# Patient Record
Sex: MALE | Race: BLACK OR AFRICAN AMERICAN | NOT HISPANIC OR LATINO | Employment: FULL TIME | URBAN - METROPOLITAN AREA
[De-identification: names, ages, dates, MRNs, and addresses within clinical notes are randomized per-mention and may not be internally consistent; named-entity substitution may affect disease eponyms.]

---

## 2022-04-25 ENCOUNTER — CLINICAL SUPPORT (OUTPATIENT)
Dept: SMOKING CESSATION | Facility: CLINIC | Age: 42
End: 2022-04-25
Payer: COMMERCIAL

## 2022-04-25 DIAGNOSIS — F17.200 NICOTINE DEPENDENCE: Primary | ICD-10-CM

## 2022-04-25 PROCEDURE — 99404 PR PREVENT COUNSEL,INDIV,60 MIN: ICD-10-PCS | Mod: S$GLB,,,

## 2022-04-25 PROCEDURE — 99404 PREV MED CNSL INDIV APPRX 60: CPT | Mod: S$GLB,,,

## 2022-04-25 RX ORDER — IBUPROFEN 200 MG
1 TABLET ORAL DAILY
Qty: 14 PATCH | Refills: 0 | Status: SHIPPED | OUTPATIENT
Start: 2022-04-25 | End: 2022-05-23 | Stop reason: SDUPTHER

## 2022-04-25 NOTE — Clinical Note
Patient will be participating in biweekly tobacco cessation sessions and will begin the prescribed tobacco cessation medication regimen of 21 mg nicotine patch daily. Patient is currently using the nicotine patch.

## 2022-05-10 ENCOUNTER — TELEPHONE (OUTPATIENT)
Dept: SMOKING CESSATION | Facility: CLINIC | Age: 42
End: 2022-05-10
Payer: COMMERCIAL

## 2022-05-23 ENCOUNTER — CLINICAL SUPPORT (OUTPATIENT)
Dept: SMOKING CESSATION | Facility: CLINIC | Age: 42
End: 2022-05-23

## 2022-05-23 DIAGNOSIS — F17.200 NICOTINE DEPENDENCE: Primary | ICD-10-CM

## 2022-05-23 PROCEDURE — 99402 PR PREVENT COUNSEL,INDIV,30 MIN: ICD-10-PCS | Mod: S$GLB,,,

## 2022-05-23 PROCEDURE — 99402 PREV MED CNSL INDIV APPRX 30: CPT | Mod: S$GLB,,,

## 2022-05-23 RX ORDER — IBUPROFEN 200 MG
1 TABLET ORAL DAILY
Qty: 14 PATCH | Refills: 0 | Status: SHIPPED | OUTPATIENT
Start: 2022-05-23 | End: 2022-09-13 | Stop reason: SDUPTHER

## 2022-05-23 RX ORDER — VARENICLINE TARTRATE 1 MG/1
1 TABLET, FILM COATED ORAL 2 TIMES DAILY
Qty: 52 TABLET | Refills: 0 | Status: SHIPPED | OUTPATIENT
Start: 2022-05-23 | End: 2022-06-21 | Stop reason: SDUPTHER

## 2022-05-23 NOTE — PROGRESS NOTES
Individual Follow-Up Form    5/23/2022    Quit Date:     Clinical Status of Patient: Outpatient      Continuing Medication: Yes, patch    Other Medications:      Target Symptoms: Withdrawal and medication side effects. The following were  rated moderate (3) to severe (4) on TCRS:  · Moderate (3):   · Severe (4):     Comments: Patient was seen in clinic today. Patient stated that he is currently smokes 3 cigarillos per day.  Patient remains on tobacco cessation medication regimen of 21 mg nicotine patch and was refill today.  Patient said he sweats a lot at work and unable to keep the patch on.  Patient to try Chantix and prescription was sent to pharmacy. Discussed and reviewed strategies, cues, and triggers. Introduced the negative impact of tobacco on health, the health advantages of discontinuing the use of tobacco, time line improved health changes after a quit, withdrawal issues to expect from nicotine and habit, and ways to achieve the goal of a quit.      Diagnosis: F17.200    Next Visit: 2 weeks

## 2022-05-23 NOTE — Clinical Note
Patient was seen in clinic today. Patient stated that he is currently smokes 3 cigarillos per day.  Patient remains on tobacco cessation medication regimen of 21 mg nicotine patch and was refill today.  Patient said he sweats a lot at work and unable to keep the patch on.  Patient to try Chantix and prescription was sent to pharmacy. Discussed and reviewed strategies, cues, and triggers. Introduced the negative impact of tobacco on health, the health advantages of discontinuing the use of tobacco, time line improved health changes after a quit, withdrawal issues to expect from nicotine and habit, and ways to achieve the goal of a quit.

## 2022-06-06 ENCOUNTER — CLINICAL SUPPORT (OUTPATIENT)
Dept: SMOKING CESSATION | Facility: CLINIC | Age: 42
End: 2022-06-06

## 2022-06-06 DIAGNOSIS — F17.200 NICOTINE DEPENDENCE: ICD-10-CM

## 2022-06-06 PROCEDURE — 99403 PR PREVENT COUNSEL,INDIV,45 MIN: ICD-10-PCS | Mod: S$GLB,,, | Performed by: FAMILY MEDICINE

## 2022-06-06 PROCEDURE — 99403 PREV MED CNSL INDIV APPRX 45: CPT | Mod: S$GLB,,, | Performed by: FAMILY MEDICINE

## 2022-06-06 NOTE — Clinical Note
Patient was seen in clinic today.  Patient stated that he smokes 1-2 cigarillos per day.  Commended patient on his accomplishment.  Patient CO monitor was 26 ppm.  Patient's truck broke down on his way to the clinic.  Therefore, he smoked right before he was seen.  Patient also reported that he is breathing better. Introduced the negative impact of tobacco on health, the health advantages of discontinuing the use of tobacco, timeline improved health changes after a quit, withdrawal issues to expect from nicotine and habit, and ways to achieve the goal of a quit.  Patient remains on prescribed tobacco cessation medication regimen of 21 mg patch and 1 mg Chnatix BID without any negative side effects at this time.  The patient will continue with sessions bi weekly and medications monitoring by CTTS.  Prescribed medication management will be by physician.

## 2022-06-06 NOTE — PROGRESS NOTES
Individual Follow-Up Form    6/6/2022    Quit Date:     Clinical Status of Patient: Outpatient        Continuing Medication: yes  Chantix, patch    Other Medications:      Target Symptoms: Withdrawal and medication side effects. The following were  rated moderate (3) to severe (4) on TCRS:  · Moderate (3): none  · Severe (4): none    Comments: Patient was seen in clinic today.  Patient stated that he smokes 1-2 cigarillos per day.  Commended patient on his accomplishment.  Patient CO monitor was 26 ppm.  Patient's truck broke down on his way to the clinic.  Therefore, he smoked right before he was seen.  Patient also reported that he is breathing better.  Introduced the negative impact of tobacco on health, the health advantages of discontinuing the use of tobacco, timeline improved health changes after a quit, withdrawal issues to expect from nicotine and habit, and ways to achieve the goal of a quit.  Patient remains on prescribed tobacco cessation medication regimen of 21 mg patch and 1 mg Chnatix BID without any negative side effects at this time.  The patient will continue with sessions bi weekly and medications monitoring by CTTS.  Prescribed medication management will be by physician.      Diagnosis: F17.200    Next Visit: 2 weeks

## 2022-06-20 ENCOUNTER — CLINICAL SUPPORT (OUTPATIENT)
Dept: SMOKING CESSATION | Facility: CLINIC | Age: 42
End: 2022-06-20
Payer: COMMERCIAL

## 2022-06-20 DIAGNOSIS — F17.200 NICOTINE DEPENDENCE: Primary | ICD-10-CM

## 2022-06-20 PROCEDURE — 99402 PR PREVENT COUNSEL,INDIV,30 MIN: ICD-10-PCS | Mod: S$GLB,,,

## 2022-06-20 PROCEDURE — 99402 PREV MED CNSL INDIV APPRX 30: CPT | Mod: S$GLB,,,

## 2022-06-20 NOTE — Clinical Note
Spoke with patient today via telephone for a length of time, he states smoking 1-2 cigarillos per day. The patient remains on the prescribed tobacco cessation medication regimen of 1 mg Chantix BID without any negative side effects at this time. The patient denies any abnormal behavioral or mental changes at this time. Refill sent to pharmacy. Patient states the cinnamon toothpicks are really helpful and will return to clinic to pick them up. Discussed and reviewed further strategies with patient, he verbalized understanding. He states being very pleased with his progress thus far and look forward to a quit soon, encouraged patient to set a quit date. The patient will continue with therapy sessions and medication monitoring by CTTS. Prescribed medication management will be by physician.

## 2022-06-21 ENCOUNTER — TELEPHONE (OUTPATIENT)
Dept: SMOKING CESSATION | Facility: CLINIC | Age: 42
End: 2022-06-21
Payer: COMMERCIAL

## 2022-06-21 RX ORDER — VARENICLINE TARTRATE 1 MG/1
1 TABLET, FILM COATED ORAL 2 TIMES DAILY
Qty: 56 TABLET | Refills: 0 | Status: SHIPPED | OUTPATIENT
Start: 2022-06-21 | End: 2022-07-19 | Stop reason: SDUPTHER

## 2022-06-21 NOTE — PROGRESS NOTES
Individual Follow-Up Form    6/21/2022    Quit Date:     Clinical Status of Patient: Outpatient        Continuing Medication: yes  Chantix    Other Medications:      Target Symptoms: Withdrawal and medication side effects. The following were  rated moderate (3) to severe (4) on TCRS:  · Moderate (3):   · Severe (4):     Comments: Spoke with patient today via telephone for a length of time, he states smoking 1-2 cigarillos per day. The patient remains on the prescribed tobacco cessation medication regimen of 1 mg Chantix BID without any negative side effects at this time. The patient denies any abnormal behavioral or mental changes at this time. Refill sent to pharmacy. Patient states the cinnamon toothpicks are really helpful and will return to clinic to pick them up. Discussed and reviewed further strategies with patient, he verbalized understanding. He states being very pleased with his progress thus far and look forward to a quit soon, encouraged patient to set a quit date. The patient will continue with therapy sessions and medication monitoring by CTTS. Prescribed medication management will be by physician.          Diagnosis: F17.200    Next Visit: 2 weeks     Vaginal Delivery

## 2022-06-21 NOTE — TELEPHONE ENCOUNTER
Called patient in regard to missed follow up appointment, had to leave a detailed message with name and telephone number for a call back.

## 2022-07-05 ENCOUNTER — CLINICAL SUPPORT (OUTPATIENT)
Dept: SMOKING CESSATION | Facility: CLINIC | Age: 42
End: 2022-07-05
Payer: COMMERCIAL

## 2022-07-05 DIAGNOSIS — F17.200 NICOTINE DEPENDENCE: ICD-10-CM

## 2022-07-05 PROCEDURE — 99404 PREV MED CNSL INDIV APPRX 60: CPT | Mod: S$GLB,,,

## 2022-07-05 PROCEDURE — 99404 PR PREVENT COUNSEL,INDIV,60 MIN: ICD-10-PCS | Mod: S$GLB,,,

## 2022-07-05 NOTE — Clinical Note
Patient was seen in clinic today for follow up.  Patient reports that he smokes 1-2 cigarillos per day.  Patient also relighting his cigarillos. The patient remains on the prescribed tobacco cessation medication  regimen of 1 mg Chantix BID without any negative side effects at this time.  No refill is needed. The patient denies any abnormal behavior or mental changes at this time.  Reviewed distraction strategies and relaxation techniques with patient. The patient will continue with sessions and medication monitoring by CTTS.  Prescribed medication management will be by physician.

## 2022-07-05 NOTE — PROGRESS NOTES
Individual Follow-Up Form    7/5/2022    Quit Date:     Clinical Status of Patient: Outpatient      Continuing Medication: yes  Chantix    Other Medications: none     Target Symptoms: Withdrawal and medication side effects. The following were  rated moderate (3) to severe (4) on TCRS:  · Moderate (3): none  · Severe (4): none    Comments:  Patient was seen in clinic today for follow up.  Patient reports that he smokes 1-2 cigarillos per day.  Patient also relighting his cigarillos. The patient remains on the prescribed tobacco cessation medication  regimen of 1 mg Chantix BID without any negative side effects at this time.  No refill is needed. The patient denies any abnormal behavior or mental changes at this time.  Reviewed distraction strategies and relaxation techniques with patient. The patient will continue with sessions and medication monitoring by CTTS.  Prescribed medication management will be by physician.        Diagnosis: F17.200    Next Visit: 2 weeks

## 2022-07-19 ENCOUNTER — CLINICAL SUPPORT (OUTPATIENT)
Dept: SMOKING CESSATION | Facility: CLINIC | Age: 42
End: 2022-07-19

## 2022-07-19 DIAGNOSIS — F17.200 NICOTINE DEPENDENCE: ICD-10-CM

## 2022-07-19 PROCEDURE — 99403 PREV MED CNSL INDIV APPRX 45: CPT | Mod: S$GLB,,, | Performed by: FAMILY MEDICINE

## 2022-07-19 PROCEDURE — 99403 PR PREVENT COUNSEL,INDIV,45 MIN: ICD-10-PCS | Mod: S$GLB,,, | Performed by: FAMILY MEDICINE

## 2022-07-19 RX ORDER — VARENICLINE TARTRATE 1 MG/1
1 TABLET, FILM COATED ORAL 2 TIMES DAILY
Qty: 56 TABLET | Refills: 0 | Status: SHIPPED | OUTPATIENT
Start: 2022-07-19 | End: 2022-08-16 | Stop reason: SDUPTHER

## 2022-07-19 NOTE — Clinical Note
Patient was seen in clinic today for follow up.  Patient states that he smokes 1 cigarillo a day.  Commended patient on the accomplishment.  The patient remains on the prescribed tobacco cessation medication  regimen of 1 mg Chantix BID  without any negative side effects at this time. Refill sent to pharmacy. The patient denies any abnormal behavior or mental changes at this time. Reviewed strategies, habitual behavior, stress, and high-risk situations. Introduced stress with addition interventions, SOLVE, relaxation with interventions, nutrition, exercise, weight gain, and the importance of rewarding oneself for accomplishments toward becoming  tobacco free. Open discussion of all items with interventions.  Prescribed medication management will be by physician.  Patient will continue bi weekly sessions.

## 2022-07-19 NOTE — PROGRESS NOTES
Individual Follow-Up Form    7/19/2022    Quit Date:     Clinical Status of Patient: Outpatient      Continuing Medication: yes  Chantix       Target Symptoms: Withdrawal and medication side effects. The following were  rated moderate (3) to severe (4) on TCRS:  · Moderate (3): none  · Severe (4): none    Comments:  Patient was seen in clinic today for follow up.  Patient states that he smokes 1 cigarillo a day.  Commended patient on the accomplishment.  The patient remains on the prescribed tobacco cessation medication  regimen of 1 mg Chantix BID  without any negative side effects at this time. Refill sent to pharmacy. The patient denies any abnormal behavior or mental changes at this time. Reviewed strategies, habitual behavior, stress, and high-risk situations. Introduced stress with addition interventions, SOLVE, relaxation with interventions, nutrition, exercise, weight gain, and the importance of rewarding oneself for accomplishments toward becoming  tobacco free. Open discussion of all items with interventions.  Prescribed medication management will be by physician.  Patient will continue bi weekly sessions.         Diagnosis: F17.200    Next Visit: 2 weeks

## 2022-08-02 ENCOUNTER — TELEPHONE (OUTPATIENT)
Dept: SMOKING CESSATION | Facility: CLINIC | Age: 42
End: 2022-08-02
Payer: COMMERCIAL

## 2022-08-02 NOTE — TELEPHONE ENCOUNTER
Called patient regarding missed follow up appointment.  Leave message and contact number was given.

## 2022-08-03 ENCOUNTER — CLINICAL SUPPORT (OUTPATIENT)
Dept: SMOKING CESSATION | Facility: CLINIC | Age: 42
End: 2022-08-03

## 2022-08-03 DIAGNOSIS — F17.200 NICOTINE DEPENDENCE: ICD-10-CM

## 2022-08-03 PROCEDURE — 99402 PREV MED CNSL INDIV APPRX 30: CPT | Mod: S$GLB,,, | Performed by: FAMILY MEDICINE

## 2022-08-03 PROCEDURE — 99402 PR PREVENT COUNSEL,INDIV,30 MIN: ICD-10-PCS | Mod: S$GLB,,, | Performed by: FAMILY MEDICINE

## 2022-08-03 NOTE — PROGRESS NOTES
Individual Follow-Up Form    8/3/2022    Quit Date:     Clinical Status of Patient: Outpatient      Continuing Medication: yes  Chantix       Target Symptoms: Withdrawal and medication side effects. The following were  rated moderate (3) to severe (4) on TCRS:  · Moderate (3): none  · Severe (4): none      Comments:  Telephone visit, spoke to patient on the phone today.  Patient continues  to smoke  1 cigarillo  per day.  Patient remains on the prescribed  tobacco cessation medication regimen of 1 mg Chantix BID without any side effects at this time.  No refill is needed. Patient reports having  family issues since last visit which interfered with focus on quitting or rate fading. We reviewed distraction strategies, urges and relaxation technique.  Prescribed medication management will be by physician.  Patient will continue bi weekly sessions.      Diagnosis: F17.200    Next Visit: 2 weeks

## 2022-08-03 NOTE — Clinical Note
Telephone visit, spoke to patient on the phone today.  Patient continues  to smoke  1 cigarillo  per day.  Patient remains on the prescribed  tobacco cessation medication regimen of 1 mg Chantix BID without any side effects at this time.  No refill is needed. Patient reports having  family issues since last visit which interfered with focus on quitting or rate fading. We reviewed distraction strategies, urges and relaxation technique.  Prescribed medication management will be by physician.  Patient will continue bi weekly sessions.

## 2022-08-05 ENCOUNTER — CLINICAL SUPPORT (OUTPATIENT)
Dept: SMOKING CESSATION | Facility: CLINIC | Age: 42
End: 2022-08-05

## 2022-08-05 DIAGNOSIS — F17.200 NICOTINE DEPENDENCE: Primary | ICD-10-CM

## 2022-08-05 PROCEDURE — 99407 BEHAV CHNG SMOKING > 10 MIN: CPT | Mod: S$GLB,,,

## 2022-08-05 PROCEDURE — 99999 PR PBB SHADOW E&M-EST. PATIENT-LVL I: ICD-10-PCS | Mod: PBBFAC,,,

## 2022-08-05 PROCEDURE — 99407 PR TOBACCO USE CESSATION INTENSIVE >10 MINUTES: ICD-10-PCS | Mod: S$GLB,,,

## 2022-08-05 PROCEDURE — 99999 PR PBB SHADOW E&M-EST. PATIENT-LVL I: CPT | Mod: PBBFAC,,,

## 2022-08-07 NOTE — PROGRESS NOTES
Called pt to f/u on his 3 month smoking cessation quit status. Pt stated he is still smoking, but down to 1 cigarillo and he is still actively enrolled in program. Informed him of benefit period, phone follow ups, and contact information. Will complete smart form for 3 months and will continue to follow up on quit #1 episode.

## 2022-08-16 ENCOUNTER — CLINICAL SUPPORT (OUTPATIENT)
Dept: SMOKING CESSATION | Facility: CLINIC | Age: 42
End: 2022-08-16

## 2022-08-16 DIAGNOSIS — F17.200 NICOTINE DEPENDENCE: Primary | ICD-10-CM

## 2022-08-16 PROCEDURE — 99404 PR PREVENT COUNSEL,INDIV,60 MIN: ICD-10-PCS | Mod: S$GLB,,,

## 2022-08-16 PROCEDURE — 99404 PREV MED CNSL INDIV APPRX 60: CPT | Mod: S$GLB,,,

## 2022-08-16 RX ORDER — VARENICLINE TARTRATE 1 MG/1
1 TABLET, FILM COATED ORAL 2 TIMES DAILY
Qty: 56 TABLET | Refills: 0 | Status: SHIPPED | OUTPATIENT
Start: 2022-08-16 | End: 2022-09-13 | Stop reason: ALTCHOICE

## 2022-08-16 NOTE — Clinical Note
Telephonic  visit, spoke to patient today for follow up. Patient continues to smoke 1 cigarillo a day with relighting.  Patient remains on the prescribed  tobacco cessation medication regimen of 1 mg Chantix BID without any side effects at this time.  Refill sent to pharmacy.   Patient reports he is having lots of stress in his life which interfered with focus on quitting or rate fading.  Reviewed strategies to manage unexpected difficulties.  Patient's benefits end today.  Informed patient of benefit period.  Patient would like to continue with the program.  Patient is scheduled on 08-.  Will renew benefits on 08-.  Prescribed medication management will be by physician.

## 2022-08-16 NOTE — PROGRESS NOTES
Individual Follow-Up Form    8/16/2022    Quit Date:     Clinical Status of Patient: Outpatient      Continuing Medication: yes  Chantix       Target Symptoms: Withdrawal and medication side effects. The following were  rated moderate (3) to severe (4) on TCRS:  · Moderate (3): none  · Severe (4): none    Comments:  Telephonic  visit, spoke to patient today for follow up. Patient continues to smoke 1 cigarillo a day with relighting.  Patient remains on the prescribed  tobacco cessation medication regimen of 1 mg Chantix BID without any side effects at this time.  Refill sent to pharmacy.   Patient reports he is having lots of stress in his life which interfered with focus on quitting or rate fading.  Reviewed strategies to manage unexpected difficulties.  Patient's benefits end today.  Informed patient of benefit period.  Patient would like to continue with the program.  Patient is scheduled on 08-.  Will renew benefits on 08-.  Prescribed medication management will be by physician.    Diagnosis: F17.200    Next Visit: 2 weeks

## 2022-08-30 ENCOUNTER — CLINICAL SUPPORT (OUTPATIENT)
Dept: SMOKING CESSATION | Facility: CLINIC | Age: 42
End: 2022-08-30

## 2022-08-30 DIAGNOSIS — F17.200 NICOTINE DEPENDENCE: Primary | ICD-10-CM

## 2022-08-30 PROCEDURE — 99404 PR PREVENT COUNSEL,INDIV,60 MIN: ICD-10-PCS | Mod: S$GLB,,,

## 2022-08-30 PROCEDURE — 99404 PREV MED CNSL INDIV APPRX 60: CPT | Mod: S$GLB,,,

## 2022-08-30 NOTE — PROGRESS NOTES
Individual Follow-Up Form    8/30/2022    Quit Date:     Clinical Status of Patient: Outpatient      Continuing Medication: yes  Chantix       Target Symptoms: Withdrawal and medication side effects. The following were  rated moderate (3) to severe (4) on TCRS:  Moderate (3): none  Severe (4): none    Comments: Patient was seen in clinic today for follow up. Patient continues  to smoke 1-2 cigarillos per day.  Patient remains on the prescribed  tobacco cessation medication regimen of 1 mg Chantix BID without any side effects at this time.  No refill is needed.   Encouraged patient to pick a quit date.  Patient reports having family issues last few months which interfered with focus on quitting or rate fading.  Reviewed strategies to manage unexpected difficulties.  Reviewed strategies, controlling environment, cues, triggers, new goals set. Introduced high risk situations with preparation interventions, caffeine similarities with withdrawal issue of habit and nicotine, alcohol, understanding urges, cravings, stress, and relaxation. Open discussion with intervention discussion.  Prescribed medication management will be by physician.  Patient will continue bi weekly sessions.      Diagnosis: F17.200    Next Visit: 2 weeks

## 2022-09-13 ENCOUNTER — CLINICAL SUPPORT (OUTPATIENT)
Dept: SMOKING CESSATION | Facility: CLINIC | Age: 42
End: 2022-09-13
Payer: COMMERCIAL

## 2022-09-13 DIAGNOSIS — F17.200 NICOTINE DEPENDENCE: Primary | ICD-10-CM

## 2022-09-13 PROCEDURE — 99404 PR PREVENT COUNSEL,INDIV,60 MIN: ICD-10-PCS | Mod: S$GLB,,,

## 2022-09-13 PROCEDURE — 99404 PREV MED CNSL INDIV APPRX 60: CPT | Mod: S$GLB,,,

## 2022-09-13 RX ORDER — BUPROPION HYDROCHLORIDE 150 MG/1
TABLET, EXTENDED RELEASE ORAL
Qty: 60 TABLET | Refills: 0 | Status: SHIPPED | OUTPATIENT
Start: 2022-09-13 | End: 2022-10-10 | Stop reason: SDUPTHER

## 2022-09-13 RX ORDER — IBUPROFEN 200 MG
1 TABLET ORAL DAILY
Qty: 14 PATCH | Refills: 0 | Status: SHIPPED | OUTPATIENT
Start: 2022-09-13 | End: 2024-01-24 | Stop reason: SDUPTHER

## 2022-09-13 NOTE — Clinical Note
Patient was seen in clinic today for follow up.  Patient continues  to smoke 1-2 cigarillos  per day.  Patient remains on the prescribed  tobacco cessation medication regimen of 1 mg Chantix BID without any side effects at this time. No refill.   We discussed about Wellbutrin.  The patient will begin the prescribed tobacco cessation medication regimen of 150 mg Wellbutrin SR with up titration dosage and 21 mg nicotine patch.  Patient reports having  family  issues which interfered with focus on quitting or rate fading.  We discussed the importance of stress management and finding other ways to deal with stress.  Discussed rate fading with patient.  Patient's goal for the next 2 weeks is to stay tobacco free for 24 hours.  Prescribed medication management will be by physician. Patient will continue with sessions and medication monitoring by CTTS.  Patient will continue bi weekly sessions.

## 2022-09-13 NOTE — PROGRESS NOTES
Individual Follow-Up Form    9/13/2022    Quit Date:     Clinical Status of Patient: Outpatient      Continuing Medication: yes  Chantix       Target Symptoms: Withdrawal and medication side effects. The following were  rated moderate (3) to severe (4) on TCRS:  Moderate (3): none  Severe (4): none    Comments: Patient was seen in clinic today for follow up.  Patient continues  to smoke 1-2 cigarillos  per day.  Patient remains on the prescribed  tobacco cessation medication regimen of 1 mg Chantix BID without any side effects at this time. No refill.   We discussed about Wellbutrin.  The patient will begin the prescribed tobacco cessation medication regimen of 150 mg Wellbutrin SR with up titration dosage and 21 mg nicotine patch.  Patient reports having  family  issues which interfered with focus on quitting or rate fading.  We discussed the importance of stress management and finding other ways to deal with stress.  Discussed rate fading with patient.  Patient's goal for the next 2 weeks is to stay tobacco free for 24 hours.  Prescribed medication management will be by physician. Patient will continue with sessions and medication monitoring by CTTS.  Patient will continue bi weekly sessions.       Diagnosis: F17.200    Next Visit: 2 weeks

## 2022-09-26 ENCOUNTER — CLINICAL SUPPORT (OUTPATIENT)
Dept: SMOKING CESSATION | Facility: CLINIC | Age: 42
End: 2022-09-26

## 2022-09-26 DIAGNOSIS — F17.200 NICOTINE DEPENDENCE: Primary | ICD-10-CM

## 2022-09-26 PROCEDURE — 99403 PREV MED CNSL INDIV APPRX 45: CPT | Mod: S$GLB,,,

## 2022-09-26 PROCEDURE — 99403 PR PREVENT COUNSEL,INDIV,45 MIN: ICD-10-PCS | Mod: S$GLB,,,

## 2022-09-26 NOTE — Clinical Note
Patient was seen in clinic today for follow up.  Patient states he smokes 3 cigarillos per day.   Patient remains the prescribed tobacco cessation medication regimen of 150 mg Wellbutrin SR BID and 21 mg nicotine patch without any side effects at this time. No refill is needed.   Patient reports having stresses recently which interfered with focus on quitting or rate fading.  We discussed the importance of stress management and finding other ways to deal with stress.  Discussed changing habitual behavior  and using 15 minutes delay to break routine tobacco use.  We discussed willpower and willingness to quit   The patient denies any abnormal behavior or mental changes at this time.  We discussed goal the the next two week.  Patient will try again to stay tobacco free for 24 hours. Prescribed medication management will be by physician.  Patient will continue bi weekly sessions.

## 2022-09-26 NOTE — PROGRESS NOTES
Individual Follow-Up Form    9/26/2022    Quit Date:     Clinical Status of Patient: Outpatient    Continuing Medication: yes  Wellbutrin, patch     Target Symptoms: Withdrawal and medication side effects. The following were  rated moderate (3) to severe (4) on TCRS:  Moderate (3): none  Severe (4): none    Comments: Patient was seen in clinic today for follow up.  Patient states he smokes 3 cigarillos per day.   Patient remains the prescribed tobacco cessation medication regimen of 150 mg Wellbutrin SR BID and 21 mg nicotine patch without any side effects at this time. No refill is needed.   Patient reports having stresses recently which interfered with focus on quitting or rate fading.  We discussed the importance of stress management and finding other ways to deal with stress.  Discussed changing habitual behavior  and using 15 minutes delay to break routine tobacco use.  We discussed willpower and willingness to quit   The patient denies any abnormal behavior or mental changes at this time.  We discussed goal the the next two week.  Patient will try again to stay tobacco free for 24 hours. Prescribed medication management will be by physician.  Patient will continue bi weekly sessions.    Diagnosis: F17.200    Next Visit: 2 weeks

## 2022-10-10 ENCOUNTER — CLINICAL SUPPORT (OUTPATIENT)
Dept: SMOKING CESSATION | Facility: CLINIC | Age: 42
End: 2022-10-10

## 2022-10-10 DIAGNOSIS — F17.200 NICOTINE DEPENDENCE: ICD-10-CM

## 2022-10-10 PROCEDURE — 99402 PREV MED CNSL INDIV APPRX 30: CPT | Mod: S$GLB,,,

## 2022-10-10 PROCEDURE — 99402 PR PREVENT COUNSEL,INDIV,30 MIN: ICD-10-PCS | Mod: S$GLB,,,

## 2022-10-10 RX ORDER — BUPROPION HYDROCHLORIDE 150 MG/1
TABLET, EXTENDED RELEASE ORAL
Qty: 60 TABLET | Refills: 0 | Status: SHIPPED | OUTPATIENT
Start: 2022-10-10 | End: 2022-11-09

## 2022-10-10 NOTE — Clinical Note
Telephonic  visit, spoke to patient today for follow up. Patient states smoking 1-2 cigarillos per day.  Commended patient on the accomplishment.  Patient remains on the prescribed tobacco cessation medication regimen of  150 mg Wellbutrin SR BID  and 21 mg nicotine patch without any negative side effects at this time.  Refill of 150 mg Wellbutrin SR sent to pharmacy.  The patient denies any abnormal behavior or mental changes at this time.  We reviewed the importance of quitting and health benefits to expect.  We discussed distraction strategies.  Prescribed medication management will be by physician. Patient will continue with sessions and medication monitoring by CTTS.  Patient will continue bi weekly sessions.

## 2022-10-10 NOTE — PROGRESS NOTES
Individual Follow-Up Form    10/10/2022    Quit Date:     Clinical Status of Patient: Outpatient    Continuing Medication: yes  Wellbutrin, patches     Target Symptoms: Withdrawal and medication side effects. The following were  rated moderate (3) to severe (4) on TCRS:  Moderate (3): none  Severe (4): none    Comments:  Telephonic  visit, spoke to patient today for follow up. Patient states smoking 1-2 cigarillos per day.  Commended patient on the accomplishment.  Patient remains on the prescribed tobacco cessation medication regimen of  150 mg Wellbutrin SR BID  and 21 mg nicotine patch without any negative side effects at this time.  Refill of 150 mg Wellbutrin SR sent to pharmacy.  The patient denies any abnormal behavior or mental changes at this time.  We reviewed the importance of quitting and health benefits to expect.  We discussed distraction strategies.  Prescribed medication management will be by physician. Patient will continue with sessions and medication monitoring by CTTS.  Patient will continue bi weekly sessions.      Diagnosis: F17.200    Next Visit: 2 weeks

## 2022-10-24 ENCOUNTER — CLINICAL SUPPORT (OUTPATIENT)
Dept: SMOKING CESSATION | Facility: CLINIC | Age: 42
End: 2022-10-24

## 2022-10-24 DIAGNOSIS — F17.200 NICOTINE DEPENDENCE: Primary | ICD-10-CM

## 2022-10-24 PROCEDURE — 99404 PREV MED CNSL INDIV APPRX 60: CPT | Mod: S$GLB,,,

## 2022-10-24 PROCEDURE — 99404 PR PREVENT COUNSEL,INDIV,60 MIN: ICD-10-PCS | Mod: S$GLB,,,

## 2022-10-24 NOTE — Clinical Note
Patient was seen in clinic today for follow up.  Patient remains tobacco free since October 12, 2022.  Commended patient on the accomplishment.  The patient remains on the prescribed tobacco cessation medication regimen of 21 mg nicotine patch  and 150 mg Wellbutrin SR BID  without any negative side effects at this time.  No refill is needed.   Patient reports increased appetite and weight gain.  Patient also reports that he feels better and breathes better.   Completion of TCRS (Tobacco Cessation Rating Scale) reviewed strategies, cues, triggers, high risk situations, lapses, relapses, diet, exercise, stress, relaxation, sleep, habitual behavior and lifestyle changes.  Patient will continue bi weekly sessions.

## 2022-10-24 NOTE — PROGRESS NOTES
Individual Follow-Up Form    10/24/2022    Quit Date:     Clinical Status of Patient: Outpatient    Continuing Medication: yes  Wellbutrin, patches     Target Symptoms: Withdrawal and medication side effects. The following were  rated moderate (3) to severe (4) on TCRS:  Moderate (3): none  Severe (4): none    Comments: Patient was seen in clinic today for follow up.  Patient remains tobacco free since October 12, 2022.  Commended patient on the accomplishment.  The patient remains on the prescribed tobacco cessation medication regimen of 21 mg nicotine patch  and 150 mg Wellbutrin SR BID  without any negative side effects at this time.  No refill is needed.   Patient reports increased appetite and weight gain.  Patient also reports that he feels better and breathes better.   Completion of TCRS (Tobacco Cessation Rating Scale) reviewed strategies, cues, triggers, high risk situations, lapses, relapses, diet, exercise, stress, relaxation, sleep, habitual behavior and lifestyle changes.  Patient will continue bi weekly sessions.      Diagnosis: F17.200    Next Visit: 2 weeks

## 2022-11-01 ENCOUNTER — TELEPHONE (OUTPATIENT)
Dept: SMOKING CESSATION | Facility: CLINIC | Age: 42
End: 2022-11-01
Payer: COMMERCIAL

## 2022-11-07 ENCOUNTER — CLINICAL SUPPORT (OUTPATIENT)
Dept: SMOKING CESSATION | Facility: CLINIC | Age: 42
End: 2022-11-07

## 2022-11-07 DIAGNOSIS — F17.200 NICOTINE DEPENDENCE: Primary | ICD-10-CM

## 2022-11-07 PROCEDURE — 99407 PR TOBACCO USE CESSATION INTENSIVE >10 MINUTES: ICD-10-PCS | Mod: S$GLB,,,

## 2022-11-07 PROCEDURE — 99407 BEHAV CHNG SMOKING > 10 MIN: CPT | Mod: S$GLB,,,

## 2022-11-07 NOTE — PROGRESS NOTES
Sm[facility]essation [facility]-called patient regarding missed follow up  appointment.  Patient reports a lapse smoked 1 cigarillo  4 days  ago during a stressful situation at work.  Patient also reports he went out of town for work and left his Wellbutrin at home.  No refill is needed.  Reminded patient not to get discourage.  Encourage patient to get back on track and stay focused.  Patient is scheduled on November 14, 2022 at 9:00 AM.

## 2022-11-14 ENCOUNTER — CLINICAL SUPPORT (OUTPATIENT)
Dept: SMOKING CESSATION | Facility: CLINIC | Age: 42
End: 2022-11-14

## 2022-11-14 DIAGNOSIS — F17.200 NICOTINE DEPENDENCE: Primary | ICD-10-CM

## 2022-11-14 PROCEDURE — 99403 PR PREVENT COUNSEL,INDIV,45 MIN: ICD-10-PCS | Mod: S$GLB,,,

## 2022-11-14 PROCEDURE — 99403 PREV MED CNSL INDIV APPRX 45: CPT | Mod: S$GLB,,,

## 2022-11-14 NOTE — Clinical Note
Patient was seen in clinic today for follow up.  Patient states that he  had a relapse and smoked one puff of cigarillo a day since November 04, 2022.  Patient remains on the prescribed tobacco cessation medication regimen of  150 mg Wellbutrin SR BID without any negative side effects at this time.  Refill sent to pharmacy on 11- by Dr Ortega.  Reviewed strategies, cues, triggers, high risk situations, lapses, relapses, diet, exercise, stress, relaxation, sleep, habitual behavior and lifestyle changes.  Patient will continue bi weekly sessions.

## 2022-11-14 NOTE — PROGRESS NOTES
Individual Follow-Up Form    11/14/2022    Quit Date:     Clinical Status of Patient: Outpatient    Continuing Medication: yes  Wellbutrin     Target Symptoms: Withdrawal and medication side effects. The following were  rated moderate (3) to severe (4) on TCRS:  Moderate (3): none  Severe (4): none    Comments: Patient was seen in clinic today for follow up.  Patient states that he  had a relapse and smoked one puff of cigarillo a day since November 04, 2022.  Patient remains on the prescribed tobacco cessation medication regimen of  150 mg Wellbutrin SR BID without any negative side effects at this time.  Refill sent to pharmacy on 11- by Dr Ortega.  Reviewed strategies, cues, triggers, high risk situations, lapses, relapses, diet, exercise, stress, relaxation, sleep, habitual behavior and lifestyle changes.  Patient will continue bi weekly sessions.      Diagnosis: F17.200    Next Visit: 2 weeks

## 2022-11-21 ENCOUNTER — TELEPHONE (OUTPATIENT)
Dept: SMOKING CESSATION | Facility: CLINIC | Age: 42
End: 2022-11-21
Payer: COMMERCIAL

## 2022-11-21 ENCOUNTER — CLINICAL SUPPORT (OUTPATIENT)
Dept: SMOKING CESSATION | Facility: CLINIC | Age: 42
End: 2022-11-21

## 2022-11-21 DIAGNOSIS — F17.200 NICOTINE DEPENDENCE: Primary | ICD-10-CM

## 2022-11-21 PROCEDURE — 99407 BEHAV CHNG SMOKING > 10 MIN: CPT | Mod: S$GLB,,,

## 2022-11-21 PROCEDURE — 99407 PR TOBACCO USE CESSATION INTENSIVE >10 MINUTES: ICD-10-PCS | Mod: S$GLB,,,

## 2022-11-21 NOTE — TELEPHONE ENCOUNTER
Returned patient's call regarding to  Wellbutrin refill, no answer.  Left message and contact number was given.

## 2022-11-21 NOTE — PROGRESS NOTES
Returned patient's call.  Patient states that he remains tobacco free for 4 days.  Commended patient on the accomplishment.  Patient also states that he was tempted to reach for a cigarillo.  Advised patient to throw away his cigarillo and don't buy anymore.  Patient is scheduled on 11-.

## 2022-11-28 ENCOUNTER — TELEPHONE (OUTPATIENT)
Dept: SMOKING CESSATION | Facility: CLINIC | Age: 42
End: 2022-11-28

## 2022-11-28 NOTE — TELEPHONE ENCOUNTER
[facility]-called patient regarding missed follow up appointment.  Patient is at work and forgot about the appointment.  Patient states that he will call back to reschedule.

## 2022-12-19 ENCOUNTER — CLINICAL SUPPORT (OUTPATIENT)
Dept: SMOKING CESSATION | Facility: CLINIC | Age: 42
End: 2022-12-19

## 2022-12-19 DIAGNOSIS — F17.200 NICOTINE DEPENDENCE: Primary | ICD-10-CM

## 2022-12-19 PROCEDURE — 99407 BEHAV CHNG SMOKING > 10 MIN: CPT | Mod: S$GLB,,,

## 2022-12-19 PROCEDURE — 99407 PR TOBACCO USE CESSATION INTENSIVE >10 MINUTES: ICD-10-PCS | Mod: S$GLB,,,

## 2022-12-19 NOTE — PROGRESS NOTES
Called to check on patient.  Patient states that some days he smoked and some days he did not smoke. Patient also states that his job is stressful.  We discussed the importance of stress management and finding other ways to deal with stress.  Patient remains on the prescribed tobacco cessation medication regimen of  150 mg Wellbutrin SR BID without any negative side effects at this time. Informed patient of benefit period.  Patient wishes to continue the program.  Patient is scheduled on January 09, 2023 at 10:00 AM.

## 2023-01-09 ENCOUNTER — TELEPHONE (OUTPATIENT)
Dept: SMOKING CESSATION | Facility: CLINIC | Age: 43
End: 2023-01-09
Payer: COMMERCIAL

## 2023-01-09 NOTE — TELEPHONE ENCOUNTER
[facility]-called patient regarding missed follow up appointment, no answer.  Left message and contact numbers were given.

## 2023-01-30 ENCOUNTER — CLINICAL SUPPORT (OUTPATIENT)
Dept: SMOKING CESSATION | Facility: CLINIC | Age: 43
End: 2023-01-30

## 2023-01-30 DIAGNOSIS — F17.200 NICOTINE DEPENDENCE: Primary | ICD-10-CM

## 2023-01-30 PROCEDURE — 99404 PR PREVENT COUNSEL,INDIV,60 MIN: ICD-10-PCS | Mod: S$GLB,,,

## 2023-01-30 PROCEDURE — 99404 PREV MED CNSL INDIV APPRX 60: CPT | Mod: S$GLB,,,

## 2023-01-30 RX ORDER — BUPROPION HYDROCHLORIDE 150 MG/1
TABLET, EXTENDED RELEASE ORAL
Qty: 60 TABLET | Refills: 0 | Status: SHIPPED | OUTPATIENT
Start: 2023-01-30 | End: 2023-03-14

## 2023-01-30 NOTE — PROGRESS NOTES
Patient was seen in clinic today for intake.  Patient states smoking 2 cigarillos per day.  Patient will begin the prescribed tobacco cessation medication regimen of  150 mg Wellbutrin SR BID.  Patient took Wellbutrin in the past and the last dose was about 1 week ago. Patient also took NRTs and Chantix in the past and patient stated that they did not work.  Completion of TCRS (Tobacco Cessation Rating Scale) reviewed learned addiction, model, personal reasons for quitting, medications, goals and quit date. Prescribed medication management will be by physician.  Patient will continue with sessions and medication monitoring by C[facility].

## 2023-02-13 ENCOUNTER — CLINICAL SUPPORT (OUTPATIENT)
Dept: SMOKING CESSATION | Facility: CLINIC | Age: 43
End: 2023-02-13

## 2023-02-13 DIAGNOSIS — F17.200 NICOTINE DEPENDENCE: Primary | ICD-10-CM

## 2023-02-13 PROCEDURE — 99403 PREV MED CNSL INDIV APPRX 45: CPT | Mod: S$GLB,,,

## 2023-02-13 PROCEDURE — 99403 PR PREVENT COUNSEL,INDIV,45 MIN: ICD-10-PCS | Mod: S$GLB,,,

## 2023-02-13 NOTE — Clinical Note
Patient was seen in clinic today for follow up.  Chart was opened at a later time due to Epic was down.  Patient states smoking 1 cigarillo per day down from 2 cigarillos per day.  Commended patient on his accomplishment thus far.  Patient stated that he did not start Wellbutrin since the last visit because he went to [facility] pharmacy to  Wellbutrin.  The Wellbutrin was ordered through [facility].  Patient also stated that he will go to [facility] today  Wellbutrin.  Patient uses cinnamon toothpix to curb his urges.  Patient reports he  smoking from coffee and alcohol.  Discussed changing habitual behavior  and using 15 minutes delay to break routine tobacco use.  We discussed willpower and willingness to quit.  We reviewed coping with urges/cravings to smoke and motivation to quit.  Prescribed medication management will be by physician.  Patient will continue with sessions and medication monitoring by [facility].

## 2023-02-27 ENCOUNTER — TELEPHONE (OUTPATIENT)
Dept: SMOKING CESSATION | Facility: CLINIC | Age: 43
End: 2023-02-27
Payer: COMMERCIAL

## 2023-02-27 ENCOUNTER — CLINICAL SUPPORT (OUTPATIENT)
Dept: SMOKING CESSATION | Facility: CLINIC | Age: 43
End: 2023-02-27

## 2023-02-27 DIAGNOSIS — F17.200 NICOTINE DEPENDENCE: Primary | ICD-10-CM

## 2023-02-27 PROCEDURE — 99402 PREV MED CNSL INDIV APPRX 30: CPT | Mod: S$GLB,,,

## 2023-02-27 PROCEDURE — 99402 PR PREVENT COUNSEL,INDIV,30 MIN: ICD-10-PCS | Mod: S$GLB,,,

## 2023-02-27 NOTE — PROGRESS NOTES
Individual Follow-Up Form    2/27/2023    Quit Date:     Clinical Status of Patient: Outpatient    Continuing Medication: yes  Wellbutrin     Target Symptoms: Withdrawal and medication side effects. The following were  rated moderate (3) to severe (4) on TCRS:  Moderate (3): none  Severe (4): none    Comments: Patient returned call 25 minutes after his appointment time and stated that he is busy at work and forgot.  Patient preferred a telephonic visit instead.  Patient  reported he remains tobacco free since February 22, 2023.  Commended patient on his accomplishment thus far.  Patient also reported he did  the Wellbutrin and started the regimen.   Patient was reminded of how to prepare and use strategies to maintain quit.  We reviewed the importance of quitting and health benefits to expect.  Patient will continue bi weekly sessions.      Diagnosis: F17.200    Next Visit: 2 weeks

## 2023-02-27 NOTE — Clinical Note
Patient returned call 25 minutes after his appointment time and stated that he is busy at work and forgot.  Patient preferred a telephonic visit instead.  Patient  reported he remains tobacco free since February 22, 2023.  Commended patient on his accomplishment thus far.  Patient also reported he did  the Wellbutrin and started the regimen.   Patient was reminded of how to prepare and use strategies to maintain quit.  We reviewed the importance of quitting and health benefits to expect.  Patient will continue bi weekly sessions.

## 2023-03-13 ENCOUNTER — TELEPHONE (OUTPATIENT)
Dept: SMOKING CESSATION | Facility: CLINIC | Age: 43
End: 2023-03-13
Payer: COMMERCIAL

## 2023-03-13 NOTE — TELEPHONE ENCOUNTER
[facility]-called patient regarding missed follow up appointment.  Patient unable to talk right now, in the meeting.

## 2023-03-20 ENCOUNTER — CLINICAL SUPPORT (OUTPATIENT)
Dept: SMOKING CESSATION | Facility: CLINIC | Age: 43
End: 2023-03-20

## 2023-03-20 DIAGNOSIS — F17.200 NICOTINE DEPENDENCE: Primary | ICD-10-CM

## 2023-03-20 PROCEDURE — 99402 PREV MED CNSL INDIV APPRX 30: CPT | Mod: S$GLB,,,

## 2023-03-20 PROCEDURE — 99402 PR PREVENT COUNSEL,INDIV,30 MIN: ICD-10-PCS | Mod: S$GLB,,,

## 2023-03-20 NOTE — Clinical Note
Called patient regarding missed telephonic visit.  Patient stated that he had been out of town frequently due to his job.   Patient also stated that he is back smoking cigarillo, a puff every other day.  Patient remains on the prescribed tobacco cessation medication regimen of  150 mg Wellbutrin SR BID without any negative side effects at this time.  No refill is needed.   Patient reports having lots of stress at work which sparked him to smoke.  We discussed the importance of stress management and finding other ways to deal with stress.  Encouraged patient not to buy any more cigarillos.  Discussed behavioral changes to assist the patient to reach his goal of being tobacco free.  We discussed willpower and willingness to quit.  Prescribed medication management will be by physician.  Patient will continue with  telephonic sessions and medication monitoring by [facility].

## 2023-03-20 NOTE — PROGRESS NOTES
Individual Follow-Up Form    3/20/2023    Quit Date:     Clinical Status of Patient: Outpatient    Continuing Medication: yes  Wellbutrin     Target Symptoms: Withdrawal and medication side effects. The following were  rated moderate (3) to severe (4) on TCRS:  Moderate (3): none  Severe (4): none    Comments: Called patient regarding missed telephonic visit.  Patient stated that he had been out of town frequently due to his job.   Patient also stated that he is back smoking cigarillo, a puff every other day.  Patient remains on the prescribed tobacco cessation medication regimen of  150 mg Wellbutrin SR BID without any negative side effects at this time.  No refill is needed.   Patient reports having lots of stress at work which sparked him to smoke.  We discussed the importance of stress management and finding other ways to deal with stress.  Encouraged patient not to buy any more cigarillos.  Discussed behavioral changes to assist the patient to reach his goal of being tobacco free.  We discussed willpower and willingness to quit.  Prescribed medication management will be by physician.  Patient will continue with  telephonic sessions and medication monitoring by [facility]TT[facility].      Diagnosis: F17.200    Next Visit: 2 weeks

## 2023-04-03 ENCOUNTER — TELEPHONE (OUTPATIENT)
Dept: SMOKING CESSATION | Facility: CLINIC | Age: 43
End: 2023-04-03
Payer: COMMERCIAL

## 2023-04-03 NOTE — TELEPHONE ENCOUNTER
[facility]-called patient for telephonic follow up, no answer.  Left message and contact number was given.

## 2023-04-10 ENCOUNTER — TELEPHONE (OUTPATIENT)
Dept: SMOKING CESSATION | Facility: CLINIC | Age: 43
End: 2023-04-10
Payer: COMMERCIAL

## 2023-04-10 NOTE — TELEPHONE ENCOUNTER
[facility]-called patient for telephonic follow up, no answer.  Unable to leave message.

## 2023-04-19 ENCOUNTER — TELEPHONE (OUTPATIENT)
Dept: SMOKING CESSATION | Facility: CLINIC | Age: 43
End: 2023-04-19
Payer: COMMERCIAL

## 2023-07-25 ENCOUNTER — TELEPHONE (OUTPATIENT)
Dept: SMOKING CESSATION | Facility: CLINIC | Age: 43
End: 2023-07-25
Payer: COMMERCIAL

## 2023-07-25 ENCOUNTER — CLINICAL SUPPORT (OUTPATIENT)
Dept: SMOKING CESSATION | Facility: CLINIC | Age: 43
End: 2023-07-25

## 2023-07-25 DIAGNOSIS — F17.200 NICOTINE DEPENDENCE: Primary | ICD-10-CM

## 2023-07-25 PROCEDURE — 99407 BEHAV CHNG SMOKING > 10 MIN: CPT | Mod: S$GLB,,, | Performed by: DIETITIAN, REGISTERED

## 2023-07-25 PROCEDURE — 99407 PR TOBACCO USE CESSATION INTENSIVE >10 MINUTES: ICD-10-PCS | Mod: S$GLB,,, | Performed by: DIETITIAN, REGISTERED

## 2023-07-25 PROCEDURE — 99999 PR PBB SHADOW E&M-EST. PATIENT-LVL I: CPT | Mod: PBBFAC,,,

## 2023-07-25 PROCEDURE — 99999 PR PBB SHADOW E&M-EST. PATIENT-LVL I: ICD-10-PCS | Mod: PBBFAC,,,

## 2023-07-25 NOTE — PROGRESS NOTES
Spoke with patient today in regard to smoking cessation progress for 6 month telephone follow up, he states he is not tobacco free. Pt shares he recently moved to [place] for work and has been stretching out his Wellbutrin SR prescription. He continues to smoke, but has been making efforts to cut down. Pt would like a call from C[facility] (Jasmina Reyna) to see if he can do telephonic visits. Informed patient of benefit period, future follow ups, and contact information if any further help or support is needed. Will resolve episode 1 and complete smart form for 6 month follow up for Quit attempt #2.

## 2023-07-25 NOTE — TELEPHONE ENCOUNTER
Called patient-per patient requested via Joe.  No answer.  Left message and contact number was given.

## 2024-01-18 ENCOUNTER — CLINICAL SUPPORT (OUTPATIENT)
Dept: SMOKING CESSATION | Facility: CLINIC | Age: 44
End: 2024-01-18

## 2024-01-18 DIAGNOSIS — F17.200 NICOTINE DEPENDENCE: Primary | ICD-10-CM

## 2024-01-18 PROCEDURE — 99999 PR PBB SHADOW E&M-EST. PATIENT-LVL I: CPT | Mod: PBBFAC,,,

## 2024-01-18 NOTE — PROGRESS NOTES
Spoke with patient today in regard to smoking cessation progress 12 month telephone follow up, he states that he is not tobacco free.  Patient states that he briefly quit smoking but relapse and is currently smoking 2-3 cigars daily.  Patient also states he's using the tools he was taught in the program that include chewing cinnamon gum.   Commended patient on the accomplishments thus far.  Informed patient of benefit period, future follow up, and contact information if any further help or support is needed.  Counselor scheduled [facility] appointment.  Will complete smart form for 12 month follow up on Quit attempt #2 and resolve episode.

## 2024-01-23 ENCOUNTER — TELEPHONE (OUTPATIENT)
Dept: SMOKING CESSATION | Facility: CLINIC | Age: 44
End: 2024-01-23
Payer: COMMERCIAL

## 2024-01-23 NOTE — TELEPHONE ENCOUNTER
[facility]-called patient regarding missed intake appointment.  Patient states that he got the date mix up.  Patient is rescheduled for 01- at 2:30 PM.

## 2024-01-24 ENCOUNTER — CLINICAL SUPPORT (OUTPATIENT)
Dept: SMOKING CESSATION | Facility: CLINIC | Age: 44
End: 2024-01-24

## 2024-01-24 DIAGNOSIS — F17.200 NICOTINE DEPENDENCE: Primary | ICD-10-CM

## 2024-01-24 RX ORDER — DM/P-EPHED/ACETAMINOPH/DOXYLAM 30-7.5/3
2 LIQUID (ML) ORAL
Qty: 108 LOZENGE | Refills: 0 | Status: SHIPPED | OUTPATIENT
Start: 2024-01-24

## 2024-01-24 RX ORDER — IBUPROFEN 200 MG
1 TABLET ORAL DAILY
Qty: 14 PATCH | Refills: 0 | Status: SHIPPED | OUTPATIENT
Start: 2024-01-24 | End: 2024-02-07 | Stop reason: SDUPTHER

## 2024-01-24 NOTE — PROGRESS NOTES
Patient was seen in clinic today for intake.  Patient states smoking 3-4 cigarillos per day.  Patient's CO monitor was 53  ppm.  Patient will begin the prescribed tobacco cessation medication regimen of  21 mg nicotine patch QD and 2 mg nicotine lozenges.  Completion of TCRS (Tobacco Cessation Rating Scale) reviewed learned addiction, model, personal reasons for quitting, medications and goals.  Prescribed medication management will be by physician.  Patient will continue with sessions and medication monitoring by C[facility].

## 2024-02-07 ENCOUNTER — CLINICAL SUPPORT (OUTPATIENT)
Dept: SMOKING CESSATION | Facility: CLINIC | Age: 44
End: 2024-02-07

## 2024-02-07 DIAGNOSIS — F17.200 NICOTINE DEPENDENCE: Primary | ICD-10-CM

## 2024-02-07 RX ORDER — BUPROPION HYDROCHLORIDE 150 MG/1
TABLET, EXTENDED RELEASE ORAL
Qty: 60 TABLET | Refills: 0 | Status: SHIPPED | OUTPATIENT
Start: 2024-02-07 | End: 2024-04-09 | Stop reason: SDUPTHER

## 2024-02-07 RX ORDER — IBUPROFEN 200 MG
1 TABLET ORAL DAILY
Qty: 14 PATCH | Refills: 0 | Status: SHIPPED | OUTPATIENT
Start: 2024-02-07

## 2024-02-07 NOTE — Clinical Note
Telephone visit, spoke to patient today for follow up. Patient called and preferred and telephone visit instead.  Patient states smoking  2-3 cigarillos down from 3-4 cigarillos per day.Commended patient on his accomplishment thus far.  Patient remains on the prescribed tobacco cessation medication regimen of 21 mg nicotine patch QD and 2 mg nicotine lozenges without any negative side effects at this time.  Refill  21 mg nicotine sent to pharmacy.  We discussed about Wellbutrin and patient took Wellbutrin in the past without any negative side effects.  The patient will begin the prescribed tobacco cessation medication regimen of 150 mg Wellbutrin SR up titration dosage.  We reviewed coping with urges/cravings to smoke and motivation to  quit.  Discussed changing habitual behavior  and using 15 minutes delay to break routine tobacco use. Patient will continue bi weekly sessions.

## 2024-02-07 NOTE — PROGRESS NOTES
Individual Follow-Up Form    2/7/2024    Quit Date:     Clinical Status of Patient: Outpatient    Continuing Medication: yes  Patches or Nicotine Lozenges     Target Symptoms: Withdrawal and medication side effects. The following were  rated moderate (3) to severe (4) on TCRS:  Moderate (3): none  Severe (4): none    Comments: Telephone visit, spoke to patient today for follow up. Patient called and preferred and telephone visit instead.  Patient states smoking  2-3 cigarillos down from 3-4 cigarillos per day.Commended patient on his accomplishment thus far.  Patient remains on the prescribed tobacco cessation medication regimen of 21 mg nicotine patch QD and 2 mg nicotine lozenges without any negative side effects at this time.  Refill  21 mg nicotine sent to pharmacy.  We discussed about Wellbutrin and patient took Wellbutrin in the past without any negative side effects.  The patient will begin the prescribed tobacco cessation medication regimen of 150 mg Wellbutrin SR up titration dosage.  We reviewed coping with urges/cravings to smoke and motivation to  quit.  Discussed changing habitual behavior  and using 15 minutes delay to break routine tobacco use. Patient will continue bi weekly sessions.    Diagnosis: F17.200    Next Visit: 2 weeks

## 2024-02-22 ENCOUNTER — CLINICAL SUPPORT (OUTPATIENT)
Dept: SMOKING CESSATION | Facility: CLINIC | Age: 44
End: 2024-02-22

## 2024-02-22 DIAGNOSIS — F17.200 NICOTINE DEPENDENCE: Primary | ICD-10-CM

## 2024-02-22 NOTE — Clinical Note
Telephone visit, spoke to patient today for follow up. Patient continues  to smoke  2-3 cigarillos per day.  Patient remains on the prescribed tobacco cessation medication regimen of  150 mg Wellbutrin SR BID and 21 mg nicotine patches without any negative side effects at this time.  Patient reports not using the nicotine patch daily due to sweating and the patch came off.  Informed patient about Coband tape.  Reminded patient the importance of use nicotine patch every day in order  to becoming tobacco free.  Patient discontinue 2 mg nicotine lozenges due to unpleasant taste. No refill is needed.  Discussed changing habitual behavior  and using 15 minutes delay to break routine tobacco use. We reviewed coping with urges/cravings to smoke and motivation to quit.  Patient will continue bi weekly sessions.

## 2024-02-22 NOTE — PROGRESS NOTES
Individual Follow-Up Form    2/22/2024    Quit Date:     Clinical Status of Patient: Outpatient    Continuing Medication: yes  Wellbutrin or Patches     Target Symptoms: Withdrawal and medication side effects. The following were  rated moderate (3) to severe (4) on TCRS:  Moderate (3): none  Severe (4): none    Comments: Telephone visit, spoke to patient today for follow up. Patient continues  to smoke  2-3 cigarillos per day.  Patient remains on the prescribed tobacco cessation medication regimen of  150 mg Wellbutrin SR BID and 21 mg nicotine patches without any negative side effects at this time.  Patient reports not using the nicotine patch daily due to sweating and the patch came off.  Informed patient about Coband tape.  Reminded patient the importance of use nicotine patch every day in order  to becoming tobacco free.  Patient discontinue 2 mg nicotine lozenges due to unpleasant taste. No refill is needed.  Discussed changing habitual behavior  and using 15 minutes delay to break routine tobacco use. We reviewed coping with urges/cravings to smoke and motivation to quit.  Patient will continue bi weekly sessions.      Diagnosis: F17.200    Next Visit: 2 weeks

## 2024-03-11 ENCOUNTER — TELEPHONE (OUTPATIENT)
Dept: SMOKING CESSATION | Facility: CLINIC | Age: 44
End: 2024-03-11
Payer: COMMERCIAL

## 2024-03-11 NOTE — TELEPHONE ENCOUNTER
[facility]-called patient for missed follow up appointment.  Patient is scheduled on 03- at 3:30 PM.

## 2024-03-13 ENCOUNTER — CLINICAL SUPPORT (OUTPATIENT)
Dept: SMOKING CESSATION | Facility: CLINIC | Age: 44
End: 2024-03-13

## 2024-03-13 DIAGNOSIS — F17.200 NICOTINE DEPENDENCE: Primary | ICD-10-CM

## 2024-03-13 NOTE — PROGRESS NOTES
Individual Follow-Up Form    3/13/2024    Quit Date:     Clinical Status of Patient: Outpatient    Length of Service: 30 minutes    Continuing Medication: yes  Wellbutrin    Other Medications: none     Target Symptoms: Withdrawal and medication side effects. The following were  rated moderate (3) to severe (4) on TCRS:  Moderate (3): none  Severe (4): none    Comments: Telephone visit, spoke to patient today for follow up.  Patient called and would like a telephone visit due to Covid positive test result. Patient continues  to smoke 2 cigarillos per day.  Patient states that because he has Covid he remains tobacco free since 4 days ago.  Patient remains on the prescribed tobacco cessation medication regimen of  150 mg Wellbutrin SR BID without any negative side effects at this time.  No refill is needed. We reviewed coping with urges/cravings to smoke and motivation to stay quit.  We reviewed the importance of quitting and health benefits to expect.  Patient will continue bi weekly sessions.    Diagnosis: F17.200    Next Visit: 2 weeks

## 2024-03-13 NOTE — Clinical Note
Telephone visit, spoke to patient today for follow up.  Patient called and would like a telephone visit due to Covid positive test result. Patient continues  to smoke 2 cigarillos per day.  Patient states that because he has Covid he remains tobacco free since 4 days ago.  Patient remains on the prescribed tobacco cessation medication regimen of  150 mg Wellbutrin SR BID without any negative side effects at this time.  No refill is needed. We reviewed coping with urges/cravings to smoke and motivation to stay quit.  We reviewed the importance of quitting and health benefits to expect.  Patient will continue bi weekly sessions.

## 2024-04-01 ENCOUNTER — TELEPHONE (OUTPATIENT)
Dept: SMOKING CESSATION | Facility: CLINIC | Age: 44
End: 2024-04-01
Payer: COMMERCIAL

## 2024-04-01 NOTE — TELEPHONE ENCOUNTER
[facility]-called patient for missed follow up appointment.  Patient is scheduled on April 09, 2024 at 2 PM.

## 2024-04-09 ENCOUNTER — CLINICAL SUPPORT (OUTPATIENT)
Dept: SMOKING CESSATION | Facility: CLINIC | Age: 44
End: 2024-04-09

## 2024-04-09 DIAGNOSIS — F17.200 NICOTINE DEPENDENCE: Primary | ICD-10-CM

## 2024-04-09 RX ORDER — BUPROPION HYDROCHLORIDE 150 MG/1
TABLET, EXTENDED RELEASE ORAL
Qty: 60 TABLET | Refills: 0 | Status: SHIPPED | OUTPATIENT
Start: 2024-04-09 | End: 2024-05-08 | Stop reason: SDUPTHER

## 2024-04-09 NOTE — PROGRESS NOTES
Individual Follow-Up Form    4/9/2024    Quit Date:     Clinical Status of Patient: Outpatient    Length of Service: 30 minutes    Continuing Medication: yes  Wellbutrin    Other Medications: none     Target Symptoms: Withdrawal and medication side effects. The following were  rated moderate (3) to severe (4) on TCRS:  Moderate (3): none  Severe (4): none    Comments: Telephone visit, spoke to patient today for follow up.  Patient called and requested a telephone visit due to  working out of town.  Patient continues  to smoke 2 cigarillos per day.  Patient remains on the prescribed tobacco cessation medication regimen of  150 mg Wellbutrin SR BID without any negative side effects at this time. Refill sent to pharmacy per MD.  Patient states that stresses from work and family issues are factors to smoke. We discussed the importance of stress management and finding other ways to deal with stress.  Discussed tips and strategies to help with quit.  We discussed the importance of behavior change and building willpower. Encouraged patient to try a rate reduction to about 1 cigarillo for next visit.  Patient will continue bi weekly sessions.      Diagnosis: F17.200    Next Visit: 2 weeks

## 2024-04-09 NOTE — Clinical Note
Telephone visit, spoke to patient today for follow up.  Patient called and requested a telephone visit due to  working out of town.  Patient continues  to smoke 2 cigarillos per day.  Patient remains on the prescribed tobacco cessation medication regimen of  150 mg Wellbutrin SR BID without any negative side effects at this time. Refill sent to pharmacy per MD.  Patient states that stresses from work and family issues are factors to smoke. We discussed the importance of stress management and finding other ways to deal with stress.  Discussed tips and strategies to help with quit.  We discussed the importance of behavior change and building willpower. Encouraged patient to try a rate reduction to about 1 cigarillo for next visit.  Patient will continue bi weekly sessions.

## 2024-04-23 ENCOUNTER — TELEPHONE (OUTPATIENT)
Dept: SMOKING CESSATION | Facility: CLINIC | Age: 44
End: 2024-04-23

## 2024-04-23 NOTE — TELEPHONE ENCOUNTER
Returned patient's call -Patient would like to reschedule his appointment.  Patient is rescheduled on April 24, 2023 at 4 PM.

## 2024-04-24 ENCOUNTER — CLINICAL SUPPORT (OUTPATIENT)
Dept: SMOKING CESSATION | Facility: CLINIC | Age: 44
End: 2024-04-24
Payer: COMMERCIAL

## 2024-04-24 DIAGNOSIS — F17.200 NICOTINE DEPENDENCE: Primary | ICD-10-CM

## 2024-04-24 PROCEDURE — 99402 PREV MED CNSL INDIV APPRX 30: CPT | Mod: S$GLB,,,

## 2024-04-24 NOTE — Clinical Note
Patient was seen in clinic today for follow up.  Patient continues  to smoke 2 cigarillos  per day.  Patient remains on the prescribed tobacco cessation medication regimen of  150 mg Wellbutrin SR BID without any negative side effects at this time.  No refill is needed.  Patient reports not taking Wellbutrin daily. Educated the usage of Wellbutrin.  Patient verbalized understanding and willingness to use Wellbutrin daily. Encouraged patient to try a rate reduction to about 1 cigarillo per day for next visit.  Patient said he was too caught up with work at this time to focus on quitting.  Encouraged patient to stay focus his quit and using better strategies.  We discussed distraction strategies and importance of applying to daily lifestyle.Patient will continue bi weekly sessions.

## 2024-04-24 NOTE — PROGRESS NOTES
Individual Follow-Up Form    4/24/2024    Quit Date:     Clinical Status of Patient: Outpatient    Length of Service: 30 minutes    Continuing Medication: yes  Wellbutrin    Other Medications:      Target Symptoms: Withdrawal and medication side effects. The following were  rated moderate (3) to severe (4) on TCRS:  Moderate (3): none  Severe (4): none    Comments: Patient was seen in clinic today for follow up.  Patient continues  to smoke 2 cigarillos  per day.  Patient remains on the prescribed tobacco cessation medication regimen of  150 mg Wellbutrin SR BID without any negative side effects at this time.    No refill is needed.  Patient reports not taking Wellbutrin daily. Educated the usage of Wellbutrin.  Patient verbalized understanding and willingness to use Wellbutrin daily. Encouraged patient to try a rate reduction to about 1 cigarillo per day for next visit.  Patient said he was too caught up with work at this time to focus on quitting.  Encouraged patient to stay focus his quit and using better strategies.  We discussed distraction strategies and importance of applying to daily lifestyle.Patient will continue bi weekly sessions.        Diagnosis: F17.200    Next Visit: 2 weeks

## 2024-05-08 ENCOUNTER — CLINICAL SUPPORT (OUTPATIENT)
Dept: SMOKING CESSATION | Facility: CLINIC | Age: 44
End: 2024-05-08

## 2024-05-08 ENCOUNTER — TELEPHONE (OUTPATIENT)
Dept: SMOKING CESSATION | Facility: CLINIC | Age: 44
End: 2024-05-08

## 2024-05-08 DIAGNOSIS — F17.200 NICOTINE DEPENDENCE: Primary | ICD-10-CM

## 2024-05-08 RX ORDER — BUPROPION HYDROCHLORIDE 150 MG/1
TABLET, EXTENDED RELEASE ORAL
Qty: 60 TABLET | Refills: 0 | Status: SHIPPED | OUTPATIENT
Start: 2024-05-08 | End: 2024-06-11 | Stop reason: SDDI

## 2024-05-08 NOTE — TELEPHONE ENCOUNTER
[facility]-called patient for telephonic follow up appointment, no answer. Left message and contact number was given.

## 2024-05-08 NOTE — PROGRESS NOTES
Individual Follow-Up Form    5/8/2024    Quit Date:     Clinical Status of Patient: Outpatient    Length of Service: 30 minutes    Continuing Medication: yes  Wellbutrin    Other Medications:      Target Symptoms: Withdrawal and medication side effects. The following were  rated moderate (3) to severe (4) on TCRS:  Moderate (3): none  Severe (4): none    Comments: Telephone visit, spoke to patient today for follow up. Patient prefers telephone visit due to  working out of town.  Patient states that he smokes 1.5 cigarillos per day.  Patient remains on the prescribed tobacco cessation medication regimen of  150 mg Wellbutrin SR BID without any negative side effects at this time.  Refill sent to pharmacy per MD.  We discussed the importance of setting a quit date.   Patient states that he will try not to buy any more tobacco by this Friday. Discussed tips and strategies to help with quit. Informed patient of benefit period. Patient would like to continue the program.  Patient will continue bi weekly sessions.        Diagnosis: F17.200    Next Visit: 2 weeks

## 2024-05-08 NOTE — Clinical Note
Telephone visit, spoke to patient today for follow up. Patient prefers telephone visit due to  working out of town.  Patient states that he smokes 1.5 cigarillos per day.  Patient remains on the prescribed tobacco cessation medication regimen of  150 mg Wellbutrin SR BID without any negative side effects at this time.  Refill sent to pharmacy per MD.  We discussed the importance of setting a quit date.  Patient states that he will try not to buy any more tobacco by this Friday. Discussed tips and strategies to help with quit. Informed patient of benefit period. Patient would like to continue the program.  Patient will continue bi weekly sessions.

## 2024-05-22 ENCOUNTER — TELEPHONE (OUTPATIENT)
Dept: SMOKING CESSATION | Facility: CLINIC | Age: 44
End: 2024-05-22

## 2024-06-03 ENCOUNTER — OFFICE VISIT (OUTPATIENT)
Dept: PODIATRY | Facility: CLINIC | Age: 44
End: 2024-06-03

## 2024-06-03 VITALS — BODY MASS INDEX: 34.37 KG/M2 | HEIGHT: 70 IN | WEIGHT: 240.06 LBS

## 2024-06-03 DIAGNOSIS — B35.3 TINEA PEDIS OF LEFT FOOT: Primary | ICD-10-CM

## 2024-06-03 PROCEDURE — 99213 OFFICE O/P EST LOW 20 MIN: CPT | Mod: PBBFAC,PO | Performed by: STUDENT IN AN ORGANIZED HEALTH CARE EDUCATION/TRAINING PROGRAM

## 2024-06-03 PROCEDURE — 99999 PR PBB SHADOW E&M-EST. PATIENT-LVL III: CPT | Mod: PBBFAC,,, | Performed by: STUDENT IN AN ORGANIZED HEALTH CARE EDUCATION/TRAINING PROGRAM

## 2024-06-03 PROCEDURE — 99203 OFFICE O/P NEW LOW 30 MIN: CPT | Mod: S$PBB,,, | Performed by: STUDENT IN AN ORGANIZED HEALTH CARE EDUCATION/TRAINING PROGRAM

## 2024-06-03 NOTE — PROGRESS NOTES
Subjective:      Patient ID: Mohan Ponce is a 43 y.o. male.    Chief Complaint: Callouses    Mr. Ponce presents today with painful calluses in the 3rd and 4th interspaces. 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 There is tenderness and pain to the toes with walking and manual manipulation.     Review of Systems   Skin:  Positive for suspicious lesions.   All other systems reviewed and are negative.          Objective:      Physical Exam  Cardiovascular:      Pulses:           Dorsalis pedis pulses are 2+ on the left side.        Posterior tibial pulses are 2+ on the left side.   Feet:      Comments: Mixed callus with maceration of the 4th and 3rd interspaces with tenderness. No apparent erythema or edema.               Assessment:       Encounter Diagnosis   Name Primary?    Tinea pedis of left foot Yes         Plan:       Mohan was seen today for [facility].    Diagnoses and all orders for this visit:    Tinea pedis of left foot      I counseled the patient on his conditions, their implications and medical management.    Likely tinea infection leading to callus and fissuring. Recommend treatment with GV every other day initially and then once per week for maintenance due to the nature of his work.     F/u 1 month if needed.    Ramos Tripp DPM

## 2024-06-11 ENCOUNTER — TELEPHONE (OUTPATIENT)
Dept: SMOKING CESSATION | Facility: CLINIC | Age: 44
End: 2024-06-11
Payer: COMMERCIAL

## 2024-06-11 NOTE — TELEPHONE ENCOUNTER
[facility]-called patient for telephonic follow up appointment, no answer, no voicemail.

## 2024-06-13 DIAGNOSIS — F17.200 NICOTINE DEPENDENCE: ICD-10-CM

## 2024-06-13 RX ORDER — BUPROPION HYDROCHLORIDE 150 MG/1
TABLET, EXTENDED RELEASE ORAL
Qty: 60 TABLET | Refills: 0 | OUTPATIENT
Start: 2024-06-13

## 2024-06-13 NOTE — TELEPHONE ENCOUNTER
Quick DC. Inappropriate Request    Refill Authorization Note   Mohan Ponce  is requesting a refill authorization.  Brief Assessment and Rationale for Refill:  Quick Discontinue  Medication Therapy Plan:  Discontinued by: Samantha Zimmerman CTTS on 6/11/2024    Medication Reconciliation Completed:  No      Comments:     Note composed:3:35 AM 06/13/2024

## 2024-06-17 ENCOUNTER — TELEPHONE (OUTPATIENT)
Dept: PODIATRY | Facility: CLINIC | Age: 44
End: 2024-06-17
Payer: COMMERCIAL

## 2024-06-17 NOTE — TELEPHONE ENCOUNTER
Left voicemail for pt to please call back to reschedule appointment due to Dr. Tripp being out on vacation.

## 2025-01-31 ENCOUNTER — TELEPHONE (OUTPATIENT)
Dept: SMOKING CESSATION | Facility: CLINIC | Age: 45
End: 2025-01-31
Payer: COMMERCIAL